# Patient Record
Sex: MALE | Race: WHITE | URBAN - METROPOLITAN AREA
[De-identification: names, ages, dates, MRNs, and addresses within clinical notes are randomized per-mention and may not be internally consistent; named-entity substitution may affect disease eponyms.]

---

## 2020-08-03 ENCOUNTER — NURSE TRIAGE (OUTPATIENT)
Dept: NURSING | Facility: CLINIC | Age: 35
End: 2020-08-03

## 2020-08-03 NOTE — TELEPHONE ENCOUNTER
Patient states he stepped on a nail with right foot; puncture wound is in middle of ball of foot.  Patient states he has been to [facility] in the past and unsure of Tetanus status.  [facility]NA unable to see any records for patient.  Transferred to scheduling for office visit.    Additional Information    Negative: Shock suspected (e.g., cold/pale/clammy skin, too weak to stand, low BP, rapid pulse)    Negative: Puncture wound of head, neck, chest, back, or abdomen that sounds life-threatening to triager    Negative: Sounds like a life-threatening emergency to the triager    Negative: Caused by an animal bite    Negative: Skin is cut or scraped, not punctured    Negative: Puncture wound of eye or eyelid    Negative: Foreign body is still in the skin (e.g., splinter, sliver, fishhook)    Negative: Puncture wound of head, neck, chest, abdomen, or overlying a joint and it could be deep    Negative: Needlestick from used or discarded injection needle (EXCEPTION: clean, unused needle)    Negative: High pressure injection injury (e.g., from grease gun or paint gun, usually work-related)    Negative: Sounds like a serious injury to the triager    Puncture wound from sharp object that was very dirty (e.g., barnyard)    Negative: SEVERE pain (e.g., excruciating)    Negative: Puncture wound of foot and hurts too much to walk on (i.e., unable to bear weight, severe limp)    Negative: Puncture wound of bare foot (no shoes) and setting was dirty    Negative: Puncture wound of finger and entire finger swollen    Protocols used: PUNCTURE WOUND-A-OH

## 2020-08-04 ENCOUNTER — OFFICE VISIT (OUTPATIENT)
Dept: FAMILY MEDICINE | Facility: CLINIC | Age: 35
End: 2020-08-04
Payer: COMMERCIAL

## 2020-08-04 VITALS
HEART RATE: 72 BPM | OXYGEN SATURATION: 98 % | BODY MASS INDEX: 29.55 KG/M2 | SYSTOLIC BLOOD PRESSURE: 138 MMHG | WEIGHT: 195 LBS | RESPIRATION RATE: 16 BRPM | DIASTOLIC BLOOD PRESSURE: 90 MMHG | HEIGHT: 68 IN

## 2020-08-04 DIAGNOSIS — T14.8XXA PUNCTURE WOUND: Primary | ICD-10-CM

## 2020-08-04 DIAGNOSIS — Z23 NEED FOR VACCINATION: ICD-10-CM

## 2020-08-04 PROCEDURE — 90715 TDAP VACCINE 7 YRS/> IM: CPT | Performed by: PHYSICIAN ASSISTANT

## 2020-08-04 PROCEDURE — 90471 IMMUNIZATION ADMIN: CPT | Performed by: PHYSICIAN ASSISTANT

## 2020-08-04 PROCEDURE — 99203 OFFICE O/P NEW LOW 30 MIN: CPT | Mod: 25 | Performed by: PHYSICIAN ASSISTANT

## 2020-08-04 ASSESSMENT — MIFFLIN-ST. JEOR: SCORE: 1789.01

## 2020-08-04 NOTE — PROGRESS NOTES
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  Bare feet, caught nail from deck.  Removed nail quickly, and cleaned with soap, water, and peroxide.  Pain was not bad.  Since it happened, pain has improved, no redness or warmth over area.  He is unsure when his last tdap was.        BP Readings from Last 3 Encounters:   08/04/20 (!) 138/90    Wt Readings from Last 3 Encounters:   08/04/20 88.5 kg (195 lb)                    Reviewed and updated as needed this visit by Provider         Review of Systems   Constitutional, HEENT, cardiovascular, pulmonary, gi and gu systems are negative, except as otherwise noted.      Objective    BP (!) 152/111 (BP Location: Left arm, Cuff Size: Adult Regular)   Pulse 72   Resp 16   Ht 1.727 m (5' 8\")   Wt 88.5 kg (195 lb)   SpO2 98%   BMI 29.65 kg/m    Body mass index is 29.65 kg/m .  Physical Exam   GENERAL: alert and no distress  EYES: Eyes grossly normal to inspection  RESP: lungs clear to auscultation - no rales, rhonchi or wheezes  CV: regular rate and rhythm, normal S1 S2, no S3 or S4, no murmur, click or rub, no peripheral edema and peripheral pulses strong  SKIN: small puncture wound right sole of foot proximal to middle MTP joint.  Non tender, no surrounding erythema.    Diagnostic Test Results:  Labs reviewed in Epic        Assessment & Plan     1. Puncture wound  Area looks good, no sign of infection.  Dressed with bacitracin and band aid.  Will keep covered with antibiotic ointment, change daily after cleaning.  Avoid soaking in water.  Watch for signs of infection.    2. Need for vaccination  Will update, unsure last time done, no record on [facility].  - TDAP, IM (10 - 64 YRS) - Adacel  - ADMIN 1st VACCINE     BMI:   Estimated body mass index is 29.65 kg/m  as calculated from the " "following:    Height as of this encounter: 1.727 m (5' 8\").    Weight as of this encounter: 88.5 kg (195 lb).       Did encourage follow up for more complete exam with labs, been many years.        No follow-ups on file.    Blaine De Los Santos PA-C  [facility]    "